# Patient Record
Sex: FEMALE | NOT HISPANIC OR LATINO
[De-identification: names, ages, dates, MRNs, and addresses within clinical notes are randomized per-mention and may not be internally consistent; named-entity substitution may affect disease eponyms.]

---

## 2024-08-26 ENCOUNTER — DASHBOARD ENCOUNTERS (OUTPATIENT)
Age: 44
End: 2024-08-26

## 2024-08-26 ENCOUNTER — LAB OUTSIDE AN ENCOUNTER (OUTPATIENT)
Dept: URBAN - METROPOLITAN AREA CLINIC 17 | Facility: CLINIC | Age: 44
End: 2024-08-26

## 2024-08-26 ENCOUNTER — OFFICE VISIT (OUTPATIENT)
Dept: URBAN - METROPOLITAN AREA CLINIC 17 | Facility: CLINIC | Age: 44
End: 2024-08-26
Payer: COMMERCIAL

## 2024-08-26 VITALS
BODY MASS INDEX: 43.4 KG/M2 | DIASTOLIC BLOOD PRESSURE: 87 MMHG | WEIGHT: 293 LBS | HEART RATE: 93 BPM | SYSTOLIC BLOOD PRESSURE: 137 MMHG | HEIGHT: 69 IN | TEMPERATURE: 97.3 F

## 2024-08-26 DIAGNOSIS — R14.0 ABDOMINAL BLOATING: ICD-10-CM

## 2024-08-26 DIAGNOSIS — B96.81 BACTERIAL INFECTION DUE TO H. PYLORI: ICD-10-CM

## 2024-08-26 DIAGNOSIS — R19.4 CHANGE IN BOWEL HABITS: ICD-10-CM

## 2024-08-26 DIAGNOSIS — K59.09 CHRONIC CONSTIPATION: ICD-10-CM

## 2024-08-26 PROBLEM — 235595009: Status: ACTIVE | Noted: 2024-08-26

## 2024-08-26 PROCEDURE — 99244 OFF/OP CNSLTJ NEW/EST MOD 40: CPT | Performed by: INTERNAL MEDICINE

## 2024-08-26 PROCEDURE — 99204 OFFICE O/P NEW MOD 45 MIN: CPT | Performed by: INTERNAL MEDICINE

## 2024-08-26 RX ORDER — OFLOXACIN 3 MG/ML
SOLUTION OPHTHALMIC
Qty: 5 UNSPECIFIED | Status: ACTIVE | COMMUNITY

## 2024-08-26 RX ORDER — ONDANSETRON HYDROCHLORIDE 4 MG/1
1 TABLET TABLET, FILM COATED ORAL
Qty: 30 | Refills: 0 | OUTPATIENT
Start: 2024-08-26

## 2024-08-26 RX ORDER — SEMAGLUTIDE 1.34 MG/ML
INJECTION, SOLUTION SUBCUTANEOUS
Qty: 3 UNSPECIFIED | Status: ACTIVE | COMMUNITY

## 2024-08-26 RX ORDER — ZOLPIDEM TARTRATE 5 MG/1
TABLET ORAL
Qty: 15 TABLET | Status: ACTIVE | COMMUNITY

## 2024-08-26 RX ORDER — INSULIN GLARGINE 100 [IU]/ML
AS DIRECTED INJECTION, SOLUTION SUBCUTANEOUS
Status: ACTIVE | COMMUNITY

## 2024-08-26 RX ORDER — LOSARTAN POTASSIUM 100 MG/1
TABLET, FILM COATED ORAL
Qty: 90 TABLET | Status: ACTIVE | COMMUNITY

## 2024-08-26 RX ORDER — ACETAMINOPHEN AND CODEINE PHOSPHATE 300; 30 MG/1; MG/1
TAKE ONE TABLET BY MOUTH EVERY 4 TO 6 HOURS AS NEEDED FOR PAIN TABLET ORAL
Qty: 15 UNSPECIFIED | Refills: 0 | Status: ACTIVE | COMMUNITY

## 2024-08-26 RX ORDER — CLONIDINE HYDROCHLORIDE 0.2 MG/1
TAKE ONE TABLET BY MOUTH ONE TIME DAILY IN THE EVENING TABLET ORAL
Qty: 30 UNSPECIFIED | Refills: 2 | Status: ACTIVE | COMMUNITY

## 2024-08-26 RX ORDER — PANTOPRAZOLE SODIUM 40 MG/1
1 TABLET IN THE MORNING ON AN EMPTY STOMACH TABLET, DELAYED RELEASE ORAL ONCE A DAY
Qty: 60 TABLET | Refills: 0 | OUTPATIENT
Start: 2024-08-26

## 2024-08-26 RX ORDER — LINACLOTIDE 72 UG/1
1 CAPSULE AT LEAST 30 MINUTES BEFORE THE FIRST MEAL OF THE DAY ON AN EMPTY STOMACH CAPSULE, GELATIN COATED ORAL ONCE A DAY
Qty: 90 | Refills: 0 | OUTPATIENT
Start: 2024-08-26 | End: 2024-11-23

## 2024-08-26 NOTE — PHYSICAL EXAM CONSTITUTIONAL:
Referral faxed    LM for patient well developed, well nourished , in no acute distress , ambulating without difficulty , normal communication ability

## 2024-08-26 NOTE — HPI-OTHER HISTORIES
New patient 44-year-old female with PMH of DM2 on insulin and HTN referred by PCP Dr. Cooper Kohler. A copy of this visit will be sent to the referring provider.  The patient presents for a colon cancer screening. Patient has never had a colonoscopy. There is no family history of colon polyps or cancer. Patient denies change in appetite and weight. Patient denies bleeding per rectum. Has hx of constipation and incomplete evacuation. Can go up to 4 days without a BM. Tried Miralax consistently and Xlax with no relief. States eating vegetables facilitate a BM but will cause diarrhea. She reports having to wipe multiple times after BMs which will cause some irritation in the rectum. Reports hx of H.Pylori for which she was treated for but did not have eradication test done. Reports some reflux and nausea intermittently.  Reports loss of appetite for the last 2-3 years. States she is eating 3x a day but small portions. She has been taking Ozempic since last month. Reports no weight loss.

## 2024-08-27 LAB
A/G RATIO: 1.6
ABSOLUTE BASOPHILS: 60
ABSOLUTE EOSINOPHILS: 210
ABSOLUTE LYMPHOCYTES: 3053
ABSOLUTE MONOCYTES: 510
ABSOLUTE NEUTROPHILS: 3668
ALBUMIN: 4.1
ALKALINE PHOSPHATASE: 117
ALT (SGPT): 22
AST (SGOT): 20
BASOPHILS: 0.8
BILIRUBIN, TOTAL: 0.3
BUN/CREATININE RATIO: (no result)
BUN: 11
CALCIUM: 9.4
CARBON DIOXIDE, TOTAL: 26
CHLORIDE: 104
CREATININE: 0.73
EGFR: 105
EOSINOPHILS: 2.8
GLOBULIN, TOTAL: 2.5
GLUCOSE: 217
HEMATOCRIT: 42.9
HEMOGLOBIN: 13.4
LYMPHOCYTES: 40.7
MCH: 26.4
MCHC: 31.2
MCV: 84.4
MONOCYTES: 6.8
MPV: 11.9
NEUTROPHILS: 48.9
PLATELET COUNT: 217
POTASSIUM: 4.4
PROTEIN, TOTAL: 6.6
RDW: 13.4
RED BLOOD CELL COUNT: 5.08
SODIUM: 141
TSH W/REFLEX TO FT4: 1.39
WHITE BLOOD CELL COUNT: 7.5

## 2024-08-30 LAB — H PYLORI BREATH TEST: DETECTED

## 2024-09-03 ENCOUNTER — TELEPHONE ENCOUNTER (OUTPATIENT)
Dept: URBAN - METROPOLITAN AREA CLINIC 17 | Facility: CLINIC | Age: 44
End: 2024-09-03

## 2024-09-03 RX ORDER — ONDANSETRON HYDROCHLORIDE 4 MG/1
1 TABLET TABLET, FILM COATED ORAL ONCE A DAY
Qty: 30 | Refills: 0 | OUTPATIENT
Start: 2024-09-03

## 2024-09-03 RX ORDER — FLUCONAZOLE 150 MG/1
1 TABLET TABLET ORAL ONCE
Qty: 1 | Refills: 0 | OUTPATIENT
Start: 2024-09-03 | End: 2024-09-04

## 2024-09-03 RX ORDER — TETRACYCLINE HYDROCHLORIDE 500 MG/1
1 CAPSULE ON AN EMPTY STOMACH CAPSULE ORAL
Qty: 56 CAPSULE | Refills: 0 | OUTPATIENT
Start: 2024-09-03 | End: 2024-09-17

## 2024-09-03 RX ORDER — METRONIDAZOLE 500 MG/1
1 TABLET TABLET ORAL
Qty: 56 TABLET | Refills: 0 | OUTPATIENT
Start: 2024-09-03 | End: 2024-09-17

## 2024-09-05 ENCOUNTER — P2P PATIENT RECORD (OUTPATIENT)
Age: 44
End: 2024-09-05

## 2024-09-17 ENCOUNTER — P2P PATIENT RECORD (OUTPATIENT)
Age: 44
End: 2024-09-17

## 2024-10-16 ENCOUNTER — OFFICE VISIT (OUTPATIENT)
Dept: URBAN - METROPOLITAN AREA CLINIC 17 | Facility: CLINIC | Age: 44
End: 2024-10-16

## 2024-10-17 ENCOUNTER — OFFICE VISIT (OUTPATIENT)
Dept: URBAN - METROPOLITAN AREA CLINIC 17 | Facility: CLINIC | Age: 44
End: 2024-10-17
Payer: COMMERCIAL

## 2024-10-17 ENCOUNTER — LAB OUTSIDE AN ENCOUNTER (OUTPATIENT)
Dept: URBAN - METROPOLITAN AREA CLINIC 17 | Facility: CLINIC | Age: 44
End: 2024-10-17

## 2024-10-17 VITALS
DIASTOLIC BLOOD PRESSURE: 85 MMHG | HEART RATE: 82 BPM | WEIGHT: 284.2 LBS | TEMPERATURE: 97.2 F | SYSTOLIC BLOOD PRESSURE: 122 MMHG | BODY MASS INDEX: 42.09 KG/M2 | HEIGHT: 69 IN

## 2024-10-17 DIAGNOSIS — R19.4 CHANGE IN BOWEL HABITS: ICD-10-CM

## 2024-10-17 DIAGNOSIS — A04.8 HELICOBACTER PYLORI INFECTION: ICD-10-CM

## 2024-10-17 DIAGNOSIS — K59.09 CHRONIC CONSTIPATION: ICD-10-CM

## 2024-10-17 DIAGNOSIS — R14.0 ABDOMINAL BLOATING: ICD-10-CM

## 2024-10-17 DIAGNOSIS — K21.9 GASTROESOPHAGEAL REFLUX DISEASE, UNSPECIFIED WHETHER ESOPHAGITIS PRESENT: ICD-10-CM

## 2024-10-17 DIAGNOSIS — R11.0 NAUSEA: ICD-10-CM

## 2024-10-17 DIAGNOSIS — B37.9 YEAST INFECTION: ICD-10-CM

## 2024-10-17 PROCEDURE — 99214 OFFICE O/P EST MOD 30 MIN: CPT | Performed by: INTERNAL MEDICINE

## 2024-10-17 RX ORDER — LINACLOTIDE 72 UG/1
1 CAPSULE AT LEAST 30 MINUTES BEFORE THE FIRST MEAL OF THE DAY ON AN EMPTY STOMACH CAPSULE, GELATIN COATED ORAL ONCE A DAY
Qty: 90 | Refills: 0 | Status: ACTIVE | COMMUNITY
Start: 2024-08-26 | End: 2024-11-23

## 2024-10-17 RX ORDER — CLONIDINE HYDROCHLORIDE 0.2 MG/1
TAKE ONE TABLET BY MOUTH ONE TIME DAILY IN THE EVENING TABLET ORAL
Qty: 30 UNSPECIFIED | Refills: 2 | Status: ACTIVE | COMMUNITY

## 2024-10-17 RX ORDER — INSULIN GLARGINE 100 [IU]/ML
AS DIRECTED INJECTION, SOLUTION SUBCUTANEOUS
Status: ACTIVE | COMMUNITY

## 2024-10-17 RX ORDER — ONDANSETRON HYDROCHLORIDE 4 MG/1
1 TABLET TABLET, FILM COATED ORAL ONCE A DAY
Qty: 30 | Refills: 0 | Status: ACTIVE | COMMUNITY
Start: 2024-09-03

## 2024-10-17 RX ORDER — CLOTRIMAZOLE 1 G/100G
1 APPLICATION AT BEDTIME CREAM VAGINAL ONCE A DAY
Qty: 7 | Refills: 0 | OUTPATIENT
Start: 2024-10-17 | End: 2024-10-24

## 2024-10-17 RX ORDER — ONDANSETRON HYDROCHLORIDE 4 MG/1
1 TABLET TABLET, FILM COATED ORAL
Qty: 30 | Refills: 0 | OUTPATIENT
Start: 2024-08-26

## 2024-10-17 RX ORDER — PANTOPRAZOLE SODIUM 40 MG/1
1 TABLET IN THE MORNING ON AN EMPTY STOMACH TABLET, DELAYED RELEASE ORAL ONCE A DAY
Qty: 90 TABLET | Refills: 1 | OUTPATIENT
Start: 2024-08-26

## 2024-10-17 RX ORDER — SEMAGLUTIDE 1.34 MG/ML
INJECTION, SOLUTION SUBCUTANEOUS
Qty: 3 UNSPECIFIED | Status: ACTIVE | COMMUNITY

## 2024-10-17 RX ORDER — PANTOPRAZOLE SODIUM 40 MG/1
1 TABLET IN THE MORNING ON AN EMPTY STOMACH TABLET, DELAYED RELEASE ORAL ONCE A DAY
Qty: 60 TABLET | Refills: 0 | Status: ACTIVE | COMMUNITY
Start: 2024-08-26

## 2024-10-17 RX ORDER — ACETAMINOPHEN AND CODEINE PHOSPHATE 300; 30 MG/1; MG/1
TAKE ONE TABLET BY MOUTH EVERY 4 TO 6 HOURS AS NEEDED FOR PAIN TABLET ORAL
Qty: 15 UNSPECIFIED | Refills: 0 | Status: ACTIVE | COMMUNITY

## 2024-10-17 RX ORDER — ZOLPIDEM TARTRATE 5 MG/1
TABLET ORAL
Qty: 15 TABLET | Status: ACTIVE | COMMUNITY

## 2024-10-17 RX ORDER — OFLOXACIN 3 MG/ML
SOLUTION OPHTHALMIC
Qty: 5 UNSPECIFIED | Status: ACTIVE | COMMUNITY

## 2024-10-17 RX ORDER — ONDANSETRON HYDROCHLORIDE 4 MG/1
1 TABLET TABLET, FILM COATED ORAL
Qty: 30 | Refills: 0 | Status: ACTIVE | COMMUNITY
Start: 2024-08-26

## 2024-10-17 RX ORDER — LOSARTAN POTASSIUM 100 MG/1
TABLET, FILM COATED ORAL
Qty: 90 TABLET | Status: ACTIVE | COMMUNITY

## 2024-10-17 NOTE — HPI-OTHER HISTORIES
08/26/2024 New patient 44-year-old female with PMH of DM2 on insulin and HTN referred by PCP Dr. Cooper Kohler. A copy of this visit will be sent to the referring provider.  The patient presents for a colon cancer screening. Patient has never had a colonoscopy. There is no family history of colon polyps or cancer. Patient denies change in appetite and weight. Patient denies bleeding per rectum. Has hx of constipation and incomplete evacuation. Can go up to 4 days without a BM. Tried Miralax consistently and Xlax with no relief. States eating vegetables facilitate a BM but will cause diarrhea. She reports having to wipe multiple times after BMs which will cause some irritation in the rectum. Reports hx of H.Pylori for which she was treated for but did not have eradication test done. Reports some reflux and nausea intermittently.  Reports loss of appetite for the last 2-3 years. States she is eating 3x a day but small portions. She has been taking Ozempic since last month. Reports no weight loss.  10/17/2024 Patient here for a follow-up. Tested positive for H.pylori. States she was only able to take 5- 6 days worth of the abx. States it causes yeast infections, diarrhea and vomiting. She states the Diflucan was not helpful. She is still having symptoms of the yeast infection.  No longer having diarrhea. Taking fiber supplement and Linzess 72 mcg. Having solid stools daily.  Still having reflux and nausea. States she is adhering to the reflux precautions and taking the PPI daily. Still doing Ozempic weekly. Last dose was Monday. Denies abdominal bloating.

## 2024-11-14 ENCOUNTER — OFFICE VISIT (OUTPATIENT)
Dept: URBAN - METROPOLITAN AREA CLINIC 17 | Facility: CLINIC | Age: 44
End: 2024-11-14

## 2024-12-03 ENCOUNTER — OFFICE VISIT (OUTPATIENT)
Dept: URBAN - METROPOLITAN AREA SURGERY CENTER 16 | Facility: SURGERY CENTER | Age: 44
End: 2024-12-03